# Patient Record
Sex: FEMALE | Race: WHITE | Employment: OTHER | URBAN - NONMETROPOLITAN AREA
[De-identification: names, ages, dates, MRNs, and addresses within clinical notes are randomized per-mention and may not be internally consistent; named-entity substitution may affect disease eponyms.]

---

## 2020-10-09 ENCOUNTER — HOSPITAL ENCOUNTER (EMERGENCY)
Age: 75
Discharge: HOME | End: 2020-10-09
Payer: MEDICARE

## 2020-10-09 VITALS
HEART RATE: 87 BPM | BODY MASS INDEX: 28.16 KG/M2 | SYSTOLIC BLOOD PRESSURE: 171 MMHG | TEMPERATURE: 98.1 F | DIASTOLIC BLOOD PRESSURE: 77 MMHG | OXYGEN SATURATION: 96 % | RESPIRATION RATE: 18 BRPM | WEIGHT: 153 LBS | HEIGHT: 62 IN

## 2020-10-09 PROCEDURE — 12013 RPR F/E/E/N/L/M 2.6-5.0 CM: CPT | Performed by: NURSE PRACTITIONER

## 2020-10-09 PROCEDURE — 99203 OFFICE O/P NEW LOW 30 MIN: CPT

## 2020-10-09 PROCEDURE — 6360000002 HC RX W HCPCS: Performed by: NURSE PRACTITIONER

## 2020-10-09 PROCEDURE — 6370000000 HC RX 637 (ALT 250 FOR IP): Performed by: NURSE PRACTITIONER

## 2020-10-09 PROCEDURE — 99203 OFFICE O/P NEW LOW 30 MIN: CPT | Performed by: NURSE PRACTITIONER

## 2020-10-09 PROCEDURE — 90715 TDAP VACCINE 7 YRS/> IM: CPT | Performed by: NURSE PRACTITIONER

## 2020-10-09 PROCEDURE — 90471 IMMUNIZATION ADMIN: CPT | Performed by: NURSE PRACTITIONER

## 2020-10-09 RX ORDER — BACITRACIN, NEOMYCIN, POLYMYXIN B 400; 3.5; 5 [USP'U]/G; MG/G; [USP'U]/G
OINTMENT TOPICAL ONCE
Status: COMPLETED | OUTPATIENT
Start: 2020-10-09 | End: 2020-10-09

## 2020-10-09 RX ADMIN — BACITRACIN, NEOMYCIN, POLYMYXIN B 1 G: 400; 3.5; 5 OINTMENT TOPICAL at 12:48

## 2020-10-09 RX ADMIN — TETANUS TOXOID, REDUCED DIPHTHERIA TOXOID AND ACELLULAR PERTUSSIS VACCINE, ADSORBED 0.5 ML: 5; 2.5; 8; 8; 2.5 SUSPENSION INTRAMUSCULAR at 12:47

## 2020-10-09 ASSESSMENT — PAIN DESCRIPTION - ONSET
ONSET_2: SUDDEN
ONSET: SUDDEN

## 2020-10-09 ASSESSMENT — PAIN DESCRIPTION - LOCATION
LOCATION: HAND
LOCATION_2: HEAD

## 2020-10-09 ASSESSMENT — PAIN SCALES - GENERAL: PAINLEVEL_OUTOF10: 1

## 2020-10-09 ASSESSMENT — PAIN DESCRIPTION - DURATION: DURATION_2: CONTINUOUS

## 2020-10-09 ASSESSMENT — ENCOUNTER SYMPTOMS
NAUSEA: 0
VOMITING: 0

## 2020-10-09 ASSESSMENT — PAIN - FUNCTIONAL ASSESSMENT: PAIN_FUNCTIONAL_ASSESSMENT: ACTIVITIES ARE NOT PREVENTED

## 2020-10-09 ASSESSMENT — PAIN DESCRIPTION - DESCRIPTORS
DESCRIPTORS_2: ACHING
DESCRIPTORS: ACHING

## 2020-10-09 ASSESSMENT — PAIN DESCRIPTION - INTENSITY: RATING_2: 2

## 2020-10-09 ASSESSMENT — PAIN DESCRIPTION - FREQUENCY: FREQUENCY: CONTINUOUS

## 2020-10-09 ASSESSMENT — PAIN DESCRIPTION - PROGRESSION
CLINICAL_PROGRESSION: NOT CHANGED
CLINICAL_PROGRESSION_2: NOT CHANGED

## 2020-10-09 ASSESSMENT — PAIN DESCRIPTION - ORIENTATION: ORIENTATION: RIGHT

## 2020-10-09 ASSESSMENT — PAIN DESCRIPTION - PAIN TYPE: TYPE: ACUTE PAIN

## 2020-10-09 NOTE — ED NOTES
4cm laceration to right eye with minimal bleeding and bruising.  Right hand bruising and swelling noted       Faiza Pryor RN  10/09/20 6334

## 2020-10-09 NOTE — ED PROVIDER NOTES
[address]  Urgent Care Encounter       CHIEF COMPLAINT       Chief Complaint   Patient presents with    Head Injury     trying to separate her dog and another while out walking and fell in the street, right hand injury and facial lac above right eye    Hand Injury    Fall    Laceration       Nurses Notes reviewed and I agree except as noted in the HPI. HISTORY OF PRESENT ILLNESS   Suzanna Espinosa is a 66 y.o. female who presents with a laceration to the right eyebrow and right hand injury after suffering a fall this afternoon. Patient was attempting to separate her dog from another and lost her balance and fell. She denies really hitting her head on the ground but believes her glasses caused the laceration. She did not lose consciousness and did not have any dizziness before or after the fall. She does not take any oral anticoagulant drugs. She also reports bruising on her right hand but states does not hurt that badly and she has normal use of the hand. She is refusing an x-ray of the hand. She is unsure of her last tetanus vaccination. The history is provided by the patient. REVIEW OF SYSTEMS     Review of Systems   Constitutional: Negative for fever. Gastrointestinal: Negative for nausea and vomiting. Musculoskeletal:        Right hand injury   Skin: Positive for wound. Neurological: Negative for numbness. PAST MEDICAL HISTORY         Diagnosis Date    Diabetes mellitus ([facility] [address]     Hypertension        SURGICALHISTORY     Patient  has a past surgical history that includes Appendectomy; Dental surgery; and back surgery.     CURRENT MEDICATIONS       Discharge Medication List as of 10/9/2020 12:45 PM      CONTINUE these medications which have NOT CHANGED    Details   lisinopril (PRINIVIL;ZESTRIL) 10 MG tablet Take 10 mg by mouth daily      metFORMIN (GLUCOPHAGE) 500 MG tablet Take 500 mg by mouth daily (with breakfast)      aspirin 81 MG tablet Take 81 mg by mouth Behavior: Behavior normal. Behavior is cooperative. DIAGNOSTIC RESULTS     Labs:No results found for this visit on 10/09/20. IMAGING:    No orders to display         EKG:      URGENT CARE COURSE:     Vitals:    10/09/20 1106   BP: (!) 171/77   Pulse: 87   Resp: 18   Temp: 98.1 °F (36.7 °C)   TempSrc: Temporal   SpO2: 96%   Weight: 153 lb (69.4 kg)   Height: 5' 2\" (1.575 m)       Medications   neomycin-bacitracin-polymyxin (NEOSPORIN) ointment (1 g Topical Given 10/9/20 1248)   Tetanus-Diphth-Acell Pertussis (BOOSTRIX) injection 0.5 mL (0.5 mLs Intramuscular Given 10/9/20 1247)            PROCEDURES:  Lac Repair    Date/Time: 10/9/2020 12:47 PM  Performed by: CYDNEY Vasquez CNP  Authorized by: CYDNEY Vasquez CNP     Consent:     Consent obtained:  Verbal    Consent given by:  Patient    Risks discussed:  Infection and poor cosmetic result  Anesthesia (see MAR for exact dosages): Anesthesia method:  Local infiltration    Local anesthetic:  Lidocaine 1% w/o epi  Laceration details:     Location:  Face    Face location:  R eyebrow    Length (cm):  4    Depth (mm):  3  Repair type:     Repair type:  Simple  Pre-procedure details:     Preparation:  Patient was prepped and draped in usual sterile fashion  Exploration:     Hemostasis achieved with:  Direct pressure    Wound exploration: wound explored through full range of motion and entire depth of wound probed and visualized      Contaminated: no    Treatment:     Area cleansed with:  Betadine (wound wash)    Amount of cleaning:  Standard    Visualized foreign bodies/material removed: no    Skin repair:     Repair method:  Sutures    Suture size:  6-0    Suture material:  Nylon    Suture technique:  Simple interrupted    Number of sutures:  15  Approximation:     Approximation:  Close  Post-procedure details:     Dressing:  Antibiotic ointment and non-adherent dressing    Patient tolerance of procedure:   Tolerated well, no immediate

## 2020-10-09 NOTE — ED NOTES
Pt given discharge instructions and verbalizes understanding. Released without complaints.       Cynthia Jefferson RN  10/09/20 1300